# Patient Record
Sex: MALE | Race: WHITE | NOT HISPANIC OR LATINO
[De-identification: names, ages, dates, MRNs, and addresses within clinical notes are randomized per-mention and may not be internally consistent; named-entity substitution may affect disease eponyms.]

---

## 2022-12-08 ENCOUNTER — TRANSCRIPTION ENCOUNTER (OUTPATIENT)
Age: 62
End: 2022-12-08

## 2022-12-08 ENCOUNTER — APPOINTMENT (OUTPATIENT)
Dept: UROLOGY | Facility: CLINIC | Age: 62
End: 2022-12-08

## 2022-12-08 VITALS
WEIGHT: 215 LBS | HEART RATE: 91 BPM | HEIGHT: 72 IN | SYSTOLIC BLOOD PRESSURE: 167 MMHG | DIASTOLIC BLOOD PRESSURE: 80 MMHG | TEMPERATURE: 97.1 F | BODY MASS INDEX: 29.12 KG/M2 | OXYGEN SATURATION: 98 %

## 2022-12-08 VITALS
HEART RATE: 91 BPM | OXYGEN SATURATION: 98 % | DIASTOLIC BLOOD PRESSURE: 88 MMHG | TEMPERATURE: 97.5 F | SYSTOLIC BLOOD PRESSURE: 124 MMHG

## 2022-12-08 DIAGNOSIS — Z00.00 ENCOUNTER FOR GENERAL ADULT MEDICAL EXAMINATION W/OUT ABNORMAL FINDINGS: ICD-10-CM

## 2022-12-08 PROCEDURE — 99205 OFFICE O/P NEW HI 60 MIN: CPT | Mod: 25

## 2022-12-08 PROCEDURE — 51798 US URINE CAPACITY MEASURE: CPT

## 2022-12-08 NOTE — HISTORY OF PRESENT ILLNESS
[FreeTextEntry1] : Merrick Downs MD\par [address] floor, [place] [address]\par \par \par CC: BPH, PSA screening, urgency, frequency, nocturia\par \par HPI: \par PSA was in the 2-3 range and up to 5 ng/ml, leading to a prostate biopsy (benign) in 2011, at which time prostate volume measured at 28 cc.\par \par His PSA has risen over the years, and has had sonograms, MRIs, and testing at [facility] which suggested low risk.  He is here in part for follow up of PSA/prostate cancer screening. \par \par In addition, he c/o an "enlarged prostate" and episodes of urinary frequency which correlates/exacerbated by constipation.  He can have urgency, frequency, nocturia, but this comes and goes.  He has never taken medication for LUTS/BPH.  He also has a sense of incomplete voiding at times, and today the PVR is 69cc. \par \par He states rare straining.  Rare weak stream.  "It is mostly the urgency, frequency, and getting up at night" but this is not all the time. \par \par Urinalysis WNL, PSA 7.0 ng/ml  11/2022 \par \par For a PSA of 4.26 (1/2016) he had an MRI 2016: 70 grams; no specific lesion \par \par 3/25/2021 TRUS demonstrated 93 cc prostate \par \par FAMHX: No  history \par SURGHX: Prostate biopsy, arthroscopic surgery \par SOCIAL:  Retired (worked for [employer] and [employer]), , 2 children, prior smoker (in his 20's) \par ROS: Ocular migraines, vertigo, HTN, hyperlipidemia, no other complaints across 10 system \par

## 2022-12-08 NOTE — ASSESSMENT
[FreeTextEntry1] : Diagnosis: \par BPH\par Frequency, urgency, nocturia\par Elevated PSA\par Retention of urine \par \par Plan\par Discussed observation, vs. mono- or dual- medical therapy, outlet procedures and compared r/b/a and side effects. \par Plan to start dual medical therapy \par \par PSA elevated\par Plan for MRI and SelectMDX for CAP screening/assessment \par \par Follow up in 3 months for check of symptoms and PVR \par \par Kaiden Nagy MD, FACS, FRCS \par  of [facility]rology [facility]\par Director of Laparoscopic and Robotic Surgery \par [facility] Director of Urology, [facility] \par Professor of Urology\par \par (Office) [phone]\par (Cell)  [phone] \par [email or web address]\par \par \par

## 2022-12-08 NOTE — PHYSICAL EXAM
[General Appearance - Well Developed] : well developed [General Appearance - Well Nourished] : well nourished [Normal Appearance] : normal appearance [Well Groomed] : well groomed [General Appearance - In No Acute Distress] : no acute distress [Edema] : no peripheral edema [] : no respiratory distress [Respiration, Rhythm And Depth] : normal respiratory rhythm and effort [Exaggerated Use Of Accessory Muscles For Inspiration] : no accessory muscle use [Abdomen Soft] : soft [Abdomen Tenderness] : non-tender [Costovertebral Angle Tenderness] : no ~M costovertebral angle tenderness [Urethral Meatus] : meatus normal [Urinary Bladder Findings] : the bladder was normal on palpation [Scrotum] : the scrotum was normal [Testes Mass (___cm)] : there were no testicular masses [No Prostate Nodules] : no prostate nodules [Prostate Size ___ gm] : prostate size [unfilled] gm [Normal Station and Gait] : the gait and station were normal for the patient's age [No Focal Deficits] : no focal deficits [Oriented To Time, Place, And Person] : oriented to person, place, and time [Affect] : the affect was normal [Mood] : the mood was normal [Not Anxious] : not anxious [No Palpable Adenopathy] : no palpable adenopathy

## 2022-12-14 ENCOUNTER — RESULT REVIEW (OUTPATIENT)
Age: 62
End: 2022-12-14

## 2022-12-14 ENCOUNTER — APPOINTMENT (OUTPATIENT)
Dept: MRI IMAGING | Facility: CLINIC | Age: 62
End: 2022-12-14

## 2022-12-14 ENCOUNTER — OUTPATIENT (OUTPATIENT)
Dept: OUTPATIENT SERVICES | Facility: HOSPITAL | Age: 62
LOS: 1 days | End: 2022-12-14

## 2022-12-14 PROCEDURE — 72197 MRI PELVIS W/O & W/DYE: CPT | Mod: 26

## 2022-12-16 ENCOUNTER — NON-APPOINTMENT (OUTPATIENT)
Age: 62
End: 2022-12-16

## 2023-01-04 LAB
APPEARANCE: CLEAR
BACTERIA UR CULT: NORMAL
BACTERIA: NEGATIVE
BILIRUBIN URINE: NEGATIVE
BLOOD URINE: NEGATIVE
CALCIUM OXALATE CRYSTALS: ABNORMAL
COLOR: YELLOW
GLUCOSE QUALITATIVE U: NEGATIVE
HYALINE CASTS: 0 /LPF
KETONES URINE: NEGATIVE
LEUKOCYTE ESTERASE URINE: NEGATIVE
MICROSCOPIC-UA: NORMAL
NITRITE URINE: NEGATIVE
PH URINE: 6
PROTEIN URINE: NORMAL
RED BLOOD CELLS URINE: 3 /HPF
SPECIFIC GRAVITY URINE: 1.03
SQUAMOUS EPITHELIAL CELLS: 0 /HPF
UROBILINOGEN URINE: NORMAL
WHITE BLOOD CELLS URINE: 1 /HPF

## 2023-02-13 VITALS — HEIGHT: 72 IN | WEIGHT: 205 LBS | BODY MASS INDEX: 27.77 KG/M2

## 2023-02-14 ENCOUNTER — APPOINTMENT (OUTPATIENT)
Dept: UROLOGY | Facility: CLINIC | Age: 63
End: 2023-02-14
Payer: COMMERCIAL

## 2023-02-14 DIAGNOSIS — Z84.89 FAMILY HISTORY OF OTHER SPECIFIED CONDITIONS: ICD-10-CM

## 2023-02-14 DIAGNOSIS — Z72.89 OTHER PROBLEMS RELATED TO LIFESTYLE: ICD-10-CM

## 2023-02-14 DIAGNOSIS — Z80.8 FAMILY HISTORY OF MALIGNANT NEOPLASM OF OTHER ORGANS OR SYSTEMS: ICD-10-CM

## 2023-02-14 DIAGNOSIS — N40.0 BENIGN PROSTATIC HYPERPLASIA WITHOUT LOWER URINARY TRACT SYMPMS: ICD-10-CM

## 2023-02-14 DIAGNOSIS — Z86.79 PERSONAL HISTORY OF OTHER DISEASES OF THE CIRCULATORY SYSTEM: ICD-10-CM

## 2023-02-14 DIAGNOSIS — Z86.39 PERSONAL HISTORY OF OTHER ENDOCRINE, NUTRITIONAL AND METABOLIC DISEASE: ICD-10-CM

## 2023-02-14 DIAGNOSIS — Z87.891 PERSONAL HISTORY OF NICOTINE DEPENDENCE: ICD-10-CM

## 2023-02-14 PROCEDURE — 99214 OFFICE O/P EST MOD 30 MIN: CPT | Mod: 95

## 2023-02-14 RX ORDER — LEVOTHYROXINE SODIUM 0.05 MG/1
50 TABLET ORAL
Refills: 0 | Status: ACTIVE | COMMUNITY

## 2023-02-14 RX ORDER — ENALAPRIL MALEATE 10 MG/1
10 TABLET ORAL
Refills: 0 | Status: ACTIVE | COMMUNITY

## 2023-02-14 RX ORDER — LIOTHYRONINE SODIUM 5 UG/1
5 TABLET ORAL
Refills: 0 | Status: ACTIVE | COMMUNITY

## 2023-02-14 RX ORDER — ROSUVASTATIN CALCIUM 20 MG/1
20 TABLET, FILM COATED ORAL
Refills: 0 | Status: ACTIVE | COMMUNITY

## 2023-02-14 RX ORDER — FENOFIBRATE 160 MG/1
160 TABLET ORAL
Refills: 0 | Status: ACTIVE | COMMUNITY

## 2023-02-14 RX ORDER — LEVOTHYROXINE SODIUM 0.3 MG/1
300 TABLET ORAL
Refills: 0 | Status: ACTIVE | COMMUNITY

## 2023-02-14 NOTE — HISTORY OF PRESENT ILLNESS
[Home] : at home, [unfilled] , at the time of the visit. [Medical Office: ([facility])___] : at the medical office located in  [Verbal consent obtained from patient] : the patient, [unfilled] [FreeTextEntry1] : Dear Dr Nagy (Urologist)\par Dear Dr Downs (PCP)\par \par \par \par Thank you so much for the referral to help care for your patient.\par \par  \par \par Chief Complaint: Elevated PSA\par Date of first visit: 02/14/2023\par \par \par MARSHALL DICKSON  is a 62 year old  man with PMHx HTN HLD BPH hypothyroidism who presents for biopsy consult. His PSA is 7.0 ng/ml. MRI on 12/14/22 demonstrated a PIRADS 3 lesion left apex pzpl. His PSA density is normal (0.07 ng/ml/cc). Has had one prior negative biopsy in 2011 (c/b retention). Denies family hx of  malignancies. \par \par 12/8/22 Select MDx 28% risk of HG disease\par \par Experiencing moderate LUTS on dual therapy. Started finasteride in December. Symptoms include frequency, urgency, double voiding. Drinks 1 cup coffee daily. Denies weak flow, dysuria.\par  \par PSA Hx (not corrected for 5ARI)\par 7.0 ng/ml on 11/29/2022\par 6.61 ng/ml on 09/08/2020 \par 2.13 ng/ml (4Kscore) on 01/12/2016\par 6.2 ng/ml on 10/23/2015 \par \par MRI Hx\par MRI at [facility] on 12/14/2022.  Volume 88.4 ml prostate with PIRADS 3 lesion #1 to the left posterolateral peripheral zone at apex measuring [4.2 x 5.4 mm ].  0.6 cm left mesorectal node. No EPE, No Bony Lesions.  The clinical implications were discussed with the patient.\par \par  MRI at [facility] [place] [place] 02/04/2016.  Volume 70.1 ml prostate with PIRADS ? lesion at the apex to mid gland. In the posterior midline base, extending the left peripheral zone. Unchanged left external iliac node and 7mm left mesorectal LN.  No EPE.  The clinical implications were discussed with the patient.\par \par MRI at [facility] 08/17/2015.  Volume 72 g prostate with PIRADS ? lesion at the left base signal abnormality-focal prostatitis versus neoplasm, measuring [0.9 x 2.1 x 0.9 cm ].  No LAD No EPE.  Left femoral neck nonspecific lesion. The clinical implications were discussed with the patient.\par \par 02/14/2023\par IPSS 11 QOL 3\par IIEF\par Erectile Function Score: 23/30\par Orgasmic Function Score: 10/10\par Sexual Desire Score: 8/10\par [place] Satisfaction: 10/15\par Overall Satisfaction: 8/10\par \par Prostate cancer screening: the patient and I spoke at length about prostate cancer screening, its risks and its benefits. The patient has 2 (age, PSA) risk factors for prostate cancer.  He understands that many men with prostate cancer will die with the disease rather than of it and we also discussed the results large multi-center American and  prostate cancer screening trials. He also understands that PSA in and of itself does not diagnose prostate cancer but only assesses risk to a certain degree. The patient understands that to definitively screen for prostate cancer, a biopsy is required and this procedure has risks, including bleeding, infection, ED and urinary retention. The patient opted to proceed with fusion biopsy.\par \par The patient denies fevers, chills, nausea and or vomiting and no unexplained weight loss.\par \par All pertinent laboratory, films and physician notes were reviewed.  Questionnaire results were discussed with patient.

## 2023-02-14 NOTE — ASSESSMENT
[FreeTextEntry1] : 63 yo male with elevated PSA 7 ng/ml, MRI with PIRADS 3 lesion left apex pzpl and 6mm mesorectal LN, normal PSAD 0.07 ng/ml/cc, one prior negative biopsy (hx of retention), neg FH. Negative NAVEED with Dr Nagy on 12/8/22.\par \par 1. Book for biopsy with Dr Lopez- discussed indication, prep, procedure, expected/adverse SE, and recovery\par 2. Need all MRIs uploaded for comparison- RDP team aware\par 3. PVR 69 at last office visit. Continue dual therapy for BPH. \par 4. If pathology +, PSMA PET/CT given mesorectal LN on MRI\par \par He understands that many men with prostate cancer will die with the disease rather than of it and we also discussed the results large multi-center American and  prostate cancer screening trials. He also understands that PSA in and of itself does not diagnose prostate cancer but only assesses risk to a certain degree. The patient understands that to definitively screen for prostate cancer, a biopsy is required and this procedure has risks, including bleeding, infection, ED and urinary retention. The patient opted to move forward with the biopsy.\par \par The patient is aware to expect hematuria x 2 weeks and upto 4 weeks of hematospermia.  There is a risk of infection albeit much lower than a transrectal approach. In some cases patients can experience erectile dysfunction but this is usually self limiting.  Any fever/chills after the biopsy the patient is to contact the office and go to the ER for an immediate evaluation. He has been given paper instructions outlining these items - which includes medications to avoid prior to surgery.\par \par 1. CBC, BMP, PSA, Covid Test, UA UCx. EKG echo report\par 2. Medical Clearance\par 3. TP biopsy at [facility]\par 4. follow up 2 weeks after biopsy with his primary urologist or ourselves.\par 5. we will call with the path results once they are resulted.\par \par Follow up MRI review\par Biopsy with Dr Lopez\par Postop visit with Dr Nagy\par \par \par \par Steff White was in the office during the entirety of this telemedicine visit.

## 2023-02-27 ENCOUNTER — APPOINTMENT (OUTPATIENT)
Dept: UROLOGY | Facility: CLINIC | Age: 63
End: 2023-02-27
Payer: COMMERCIAL

## 2023-02-27 VITALS
OXYGEN SATURATION: 97 % | SYSTOLIC BLOOD PRESSURE: 123 MMHG | HEART RATE: 100 BPM | TEMPERATURE: 97.6 F | DIASTOLIC BLOOD PRESSURE: 75 MMHG

## 2023-02-27 DIAGNOSIS — R97.20 ELEVATED PROSTATE, SPECIFIC ANTIGEN [PSA]: ICD-10-CM

## 2023-02-27 PROCEDURE — 99214 OFFICE O/P EST MOD 30 MIN: CPT

## 2023-02-28 ENCOUNTER — TRANSCRIPTION ENCOUNTER (OUTPATIENT)
Age: 63
End: 2023-02-28

## 2023-02-28 LAB
ANION GAP SERPL CALC-SCNC: 14 MMOL/L
APPEARANCE: CLEAR
BACTERIA: NEGATIVE
BASOPHILS # BLD AUTO: 0.04 K/UL
BASOPHILS NFR BLD AUTO: 0.4 %
BILIRUBIN URINE: NEGATIVE
BLOOD URINE: NEGATIVE
BUN SERPL-MCNC: 19 MG/DL
CALCIUM SERPL-MCNC: 9.8 MG/DL
CHLORIDE SERPL-SCNC: 106 MMOL/L
CO2 SERPL-SCNC: 19 MMOL/L
COLOR: YELLOW
CREAT SERPL-MCNC: 0.76 MG/DL
EGFR: 102 ML/MIN/1.73M2
EOSINOPHIL # BLD AUTO: 0.1 K/UL
EOSINOPHIL NFR BLD AUTO: 0.9 %
GLUCOSE QUALITATIVE U: ABNORMAL
GLUCOSE SERPL-MCNC: 128 MG/DL
HCT VFR BLD CALC: 47.3 %
HGB BLD-MCNC: 15.4 G/DL
HYALINE CASTS: 0 /LPF
IMM GRANULOCYTES NFR BLD AUTO: 0.5 %
KETONES URINE: NEGATIVE
LEUKOCYTE ESTERASE URINE: NEGATIVE
LYMPHOCYTES # BLD AUTO: 2.49 K/UL
LYMPHOCYTES NFR BLD AUTO: 23.6 %
MAN DIFF?: NORMAL
MCHC RBC-ENTMCNC: 28.5 PG
MCHC RBC-ENTMCNC: 32.6 GM/DL
MCV RBC AUTO: 87.6 FL
MICROSCOPIC-UA: NORMAL
MONOCYTES # BLD AUTO: 0.72 K/UL
MONOCYTES NFR BLD AUTO: 6.8 %
NEUTROPHILS # BLD AUTO: 7.15 K/UL
NEUTROPHILS NFR BLD AUTO: 67.8 %
NITRITE URINE: NEGATIVE
PH URINE: 6
PLATELET # BLD AUTO: 340 K/UL
POTASSIUM SERPL-SCNC: 4.2 MMOL/L
PROTEIN URINE: NEGATIVE
PSA FREE FLD-MCNC: 23 %
PSA FREE SERPL-MCNC: 0.66 NG/ML
PSA SERPL-MCNC: 2.93 NG/ML
RBC # BLD: 5.4 M/UL
RBC # FLD: 13.2 %
RED BLOOD CELLS URINE: 1 /HPF
SODIUM SERPL-SCNC: 139 MMOL/L
SPECIFIC GRAVITY URINE: 1.02
SQUAMOUS EPITHELIAL CELLS: 0 /HPF
UROBILINOGEN URINE: NORMAL
WBC # FLD AUTO: 10.55 K/UL
WHITE BLOOD CELLS URINE: 1 /HPF

## 2023-02-28 NOTE — PHYSICAL EXAM
[General Appearance - Well Developed] : well developed [General Appearance - Well Nourished] : well nourished [Normal Appearance] : normal appearance [Well Groomed] : well groomed [General Appearance - In No Acute Distress] : no acute distress [] : no respiratory distress [Respiration, Rhythm And Depth] : normal respiratory rhythm and effort [Exaggerated Use Of Accessory Muscles For Inspiration] : no accessory muscle use [Oriented To Time, Place, And Person] : oriented to person, place, and time [Affect] : the affect was normal [Mood] : the mood was normal [Not Anxious] : not anxious [Normal Station and Gait] : the gait and station were normal for the patient's age [No Focal Deficits] : no focal deficits [FreeTextEntry1] : neg NAVEED 12/8/22

## 2023-02-28 NOTE — ASSESSMENT
[FreeTextEntry1] : 61 yo male with elevated PSA 7 ng/ml, MRI with PIRADS 3 lesion left apex pzpl and 6mm mesorectal LN, normal PSAD 0.07 ng/ml/cc, one prior negative biopsy (hx of retention), neg FH. Negative NAVEED with Dr Nagy on 12/8/22. Select MDx 28% risk of HG disease.\par \par 1. Biopsy 3/16/23 with Dr Lopez. MRI reviewed today 2/27/23- lesion may actually PIRADS 4 if you look at focal enhancement\par 2. PVR 69 at last office visit. Continue dual therapy for BPH. \par 3. If pathology +, PSMA PET/CT given mesorectal LN on MRI\par \par IRB Notification\par \par The patient has been made aware of the opportunity to participate in our MR US fusion guided biopsy trial testing the next generation biopsy technology.  This is an IRB approved trial (IRB #).  He is already being consented for a standard MR US fusion guided biopsy therefore there is no change in his clinical work flow.  He has been given a copy of the consent to review before the biopsy date and given an opportunity to contact us with any further questions.  He will be consented on the day of the procedure or electronically before the biopsy.\par \par He understands that many men with prostate cancer will die with the disease rather than of it and we also discussed the results large multi-center American and  prostate cancer screening trials. He also understands that PSA in and of itself does not diagnose prostate cancer but only assesses risk to a certain degree. The patient understands that to definitively screen for prostate cancer, a biopsy is required and this procedure has risks, including bleeding, infection, ED and urinary retention. The patient opted to move forward with the biopsy.\par \par The patient is aware to expect hematuria x 2 weeks and upto 4 weeks of hematospermia.  There is a risk of infection albeit much lower than a transrectal approach. In some cases patients can experience erectile dysfunction but this is usually self limiting.  Any fever/chills after the biopsy the patient is to contact the office and go to the ER for an immediate evaluation. He has been given paper instructions outlining these items - which includes medications to avoid prior to surgery.\par \par 1. CBC, BMP, PSA, Covid Test, UA UCx. EKG echo report\par 2. Medical Clearance\par 3. TP biopsy at [facility]\par 4. follow up 2 weeks after biopsy with his primary urologist or ourselves.\par 5. we will call with the path results once they are resulted.\par \par Thank you very much for allowing me to assist in the care of this patient. Should you have any additional questions or concerns please do not hesitate to contact me.\par \par \par Sincerely,\par \par \par Jono Lopez D.O.\par Professor of Urology and Radiology\par  of Urology at [facility]\par System Director for Prostate Cancer\par [address] [facility], [address]\par Phone: [phone]\par \par \par

## 2023-02-28 NOTE — HISTORY OF PRESENT ILLNESS
[FreeTextEntry1] : Dear Dr Nagy (Urologist)\par Dear Dr Downs (PCP)\par \par Thank you so much for the referral to help care for your patient.\par \par \par Chief Complaint: Elevated PSA\par Date of first visit: 02/14/2023\par \par \par MARSHALL DICKSON  is a 62 year old  man with PMHx HTN HLD BPH hypothyroidism who presents for biopsy consult. His PSA is 7.0 ng/ml. MRI on 12/14/22 demonstrated a PIRADS 3 lesion left apex pzpl. His PSA density is normal (0.07 ng/ml/cc). Has had one prior negative biopsy in 2011 (c/b retention). Denies family hx of  malignancies. \par \par 12/8/22 Select MDx 28% risk of HG disease\par \par Experiencing moderate LUTS on dual therapy. Started finasteride in December. Symptoms include frequency, urgency, double voiding. Drinks 1 cup coffee daily. Denies weak flow, dysuria.\par  \par PSA Hx (not corrected for 5ARI. started it in Dec 2022)\par 7.0 ng/ml on 11/29/2022\par 6.61 ng/ml on 09/08/2020 \par 2.13 ng/ml (4Kscore) on 01/12/2016\par 6.2 ng/ml on 10/23/2015 \par \par MRI Hx\par MRI at [facility] on 12/14/2022.  Volume 88.4 ml prostate with PIRADS 3 lesion #1 to the left posterolateral peripheral zone at apex measuring [4.2 x 5.4 mm ].  0.6 cm left mesorectal node. No EPE, No Bony Lesions. The images have been reviewed and clinical implications discussed with the patient. On review, lesion may actually PIRADS 4 if you look at focal enhancement\par \par  MRI at [facility] [place] 02/04/2016.  Volume 70.1 ml prostate with PIRADS ? lesion at the apex to mid gland. In the posterior midline base, extending the left peripheral zone. Unchanged left external iliac node and 7mm left mesorectal LN.  No EPE.  The images have been reviewed and clinical implications discussed with the patient.\par \par MRI at [facility] 08/17/2015.  Volume 72 g prostate with PIRADS ? lesion at the left base signal abnormality-focal prostatitis versus neoplasm, measuring [0.9 x 2.1 x 0.9 cm ].  No LAD No EPE.  Left femoral neck nonspecific lesion. The images have been reviewed and clinical implications discussed with the patient.\par \par 02/27/2023\par IPSS 9 QOL 2\par EDWARD 20\par \par 02/14/2023\par IPSS 11 QOL 3\par IIEF\par Erectile Function Score: 23/30\par Orgasmic Function Score: 10/10\par Sexual Desire Score: 8/10\par [place] Satisfaction: 10/15\par Overall Satisfaction: 8/10\par \par Prostate cancer screening: the patient and I spoke at length about prostate cancer screening, its risks and its benefits. The patient has 2 (age, PSA) risk factors for prostate cancer.  He understands that many men with prostate cancer will die with the disease rather than of it and we also discussed the results large multi-center American and  prostate cancer screening trials. He also understands that PSA in and of itself does not diagnose prostate cancer but only assesses risk to a certain degree. The patient understands that to definitively screen for prostate cancer, a biopsy is required and this procedure has risks, including bleeding, infection, ED and urinary retention. The patient opted to proceed with fusion biopsy.\par \par The patient denies fevers, chills, nausea and or vomiting and no unexplained weight loss.\par \par All pertinent laboratory, films and physician notes were reviewed.  Questionnaire results were discussed with patient.

## 2023-03-01 LAB — BACTERIA UR CULT: NORMAL

## 2023-03-07 ENCOUNTER — APPOINTMENT (OUTPATIENT)
Dept: UROLOGY | Facility: CLINIC | Age: 63
End: 2023-03-07

## 2023-03-09 ENCOUNTER — TRANSCRIPTION ENCOUNTER (OUTPATIENT)
Age: 63
End: 2023-03-09

## 2023-03-13 LAB — SARS-COV-2 N GENE NPH QL NAA+PROBE: NOT DETECTED

## 2023-03-15 NOTE — ASU PATIENT PROFILE, ADULT - FALL HARM RISK - UNIVERSAL INTERVENTIONS
Bed in lowest position, wheels locked, appropriate side rails in place/Call bell, personal items and telephone in reach/Instruct patient to call for assistance before getting out of bed or chair/Non-slip footwear when patient is out of bed/[place] to call system/Physically safe environment - no spills, clutter or unnecessary equipment/Purposeful Proactive Rounding/Room/bathroom lighting operational, light cord in reach

## 2023-03-15 NOTE — ASU PATIENT PROFILE, ADULT - NSICDXPASTMEDICALHX_GEN_ALL_CORE_FT
PAST MEDICAL HISTORY:  CAD (coronary artery disease)     Dyslipidemia     Hypertension     Hypothyroid

## 2023-03-15 NOTE — ASU PATIENT PROFILE, ADULT - NS PREOP UNDERSTANDS INFO
NPO solids after midnight 3/15/23, clears after 10 am on DOS, bring insurance card and photo ID, Escort to bring photo ID , address and phone # reviewed with pt./yes

## 2023-03-16 ENCOUNTER — OUTPATIENT (OUTPATIENT)
Dept: OUTPATIENT SERVICES | Facility: HOSPITAL | Age: 63
LOS: 1 days | Discharge: HOME | End: 2023-03-16
Payer: COMMERCIAL

## 2023-03-16 ENCOUNTER — APPOINTMENT (OUTPATIENT)
Dept: UROLOGY | Facility: AMBULATORY SURGERY CENTER | Age: 63
End: 2023-03-16

## 2023-03-16 ENCOUNTER — RESULT REVIEW (OUTPATIENT)
Age: 63
End: 2023-03-16

## 2023-03-16 ENCOUNTER — TRANSCRIPTION ENCOUNTER (OUTPATIENT)
Age: 63
End: 2023-03-16

## 2023-03-16 VITALS
TEMPERATURE: 98 F | HEART RATE: 73 BPM | RESPIRATION RATE: 16 BRPM | SYSTOLIC BLOOD PRESSURE: 142 MMHG | DIASTOLIC BLOOD PRESSURE: 83 MMHG | OXYGEN SATURATION: 96 %

## 2023-03-16 VITALS
HEIGHT: 72 IN | OXYGEN SATURATION: 96 % | SYSTOLIC BLOOD PRESSURE: 151 MMHG | TEMPERATURE: 99 F | WEIGHT: 203.93 LBS | DIASTOLIC BLOOD PRESSURE: 81 MMHG | HEART RATE: 74 BPM | RESPIRATION RATE: 16 BRPM

## 2023-03-16 PROCEDURE — 55700: CPT | Mod: 22

## 2023-03-16 PROCEDURE — 76377 3D RENDER W/INTRP POSTPROCES: CPT | Mod: 26

## 2023-03-16 PROCEDURE — 76872 US TRANSRECTAL: CPT | Mod: 26

## 2023-03-16 PROCEDURE — G0416: CPT | Mod: 26

## 2023-03-16 RX ORDER — LEVOTHYROXINE SODIUM 125 MCG
1 TABLET ORAL
Qty: 0 | Refills: 0 | DISCHARGE

## 2023-03-16 RX ORDER — ONDANSETRON 8 MG/1
4 TABLET, FILM COATED ORAL ONCE
Refills: 0 | Status: DISCONTINUED | OUTPATIENT
Start: 2023-03-16 | End: 2023-03-16

## 2023-03-16 RX ORDER — FENTANYL CITRATE 50 UG/ML
25 INJECTION INTRAVENOUS
Refills: 0 | Status: DISCONTINUED | OUTPATIENT
Start: 2023-03-16 | End: 2023-03-16

## 2023-03-16 RX ORDER — OXYCODONE HYDROCHLORIDE 5 MG/1
5 TABLET ORAL ONCE
Refills: 0 | Status: DISCONTINUED | OUTPATIENT
Start: 2023-03-16 | End: 2023-03-16

## 2023-03-16 RX ORDER — LIOTHYRONINE SODIUM 25 UG/1
1 TABLET ORAL
Qty: 0 | Refills: 0 | DISCHARGE

## 2023-03-16 RX ORDER — FENOFIBRATE,MICRONIZED 130 MG
1 CAPSULE ORAL
Qty: 0 | Refills: 0 | DISCHARGE

## 2023-03-16 RX ORDER — SODIUM CHLORIDE 9 MG/ML
1000 INJECTION, SOLUTION INTRAVENOUS
Refills: 0 | Status: DISCONTINUED | OUTPATIENT
Start: 2023-03-16 | End: 2023-03-16

## 2023-03-16 RX ORDER — TAMSULOSIN HYDROCHLORIDE 0.4 MG/1
1 CAPSULE ORAL
Qty: 0 | Refills: 0 | DISCHARGE

## 2023-03-16 RX ORDER — HYDROMORPHONE HYDROCHLORIDE 2 MG/ML
0.5 INJECTION INTRAMUSCULAR; INTRAVENOUS; SUBCUTANEOUS
Refills: 0 | Status: DISCONTINUED | OUTPATIENT
Start: 2023-03-16 | End: 2023-03-16

## 2023-03-16 NOTE — PRE-ANESTHESIA EVALUATION ADULT - NSANTHOSAYNRD_GEN_A_CORE
No. TIERA screening performed.  STOP BANG Legend: 0-2 = LOW Risk; 3-4 = INTERMEDIATE Risk; 5-8 = HIGH Risk

## 2023-03-16 NOTE — ASU DISCHARGE PLAN (ADULT/PEDIATRIC) - CARE PROVIDER_API CALL
Kindra Lopez (DO)  Urology  [address] [facility], [place] [facility]  Phone: [phone]  Fax: [phone]  Follow Up Time:

## 2023-03-16 NOTE — BRIEF OPERATIVE NOTE - NSICDXBRIEFPROCEDURE_GEN_ALL_CORE_FT
PROCEDURES:  Transperineal template-guided mapping biopsy of prostate 16-Mar-2023 16:43:42  Heavenly Tate

## 2023-03-17 ENCOUNTER — NON-APPOINTMENT (OUTPATIENT)
Age: 63
End: 2023-03-17

## 2023-03-20 LAB — SURGICAL PATHOLOGY STUDY: SIGNIFICANT CHANGE UP

## 2023-03-21 ENCOUNTER — NON-APPOINTMENT (OUTPATIENT)
Age: 63
End: 2023-03-21

## 2023-04-04 ENCOUNTER — APPOINTMENT (OUTPATIENT)
Dept: UROLOGY | Facility: CLINIC | Age: 63
End: 2023-04-04
Payer: COMMERCIAL

## 2023-04-04 VITALS
HEIGHT: 72 IN | SYSTOLIC BLOOD PRESSURE: 127 MMHG | HEART RATE: 80 BPM | OXYGEN SATURATION: 97 % | BODY MASS INDEX: 27.77 KG/M2 | TEMPERATURE: 97.5 F | DIASTOLIC BLOOD PRESSURE: 78 MMHG | WEIGHT: 205 LBS

## 2023-04-04 PROBLEM — E03.9 HYPOTHYROIDISM, UNSPECIFIED: Chronic | Status: ACTIVE | Noted: 2023-03-16

## 2023-04-04 PROBLEM — I25.10 ATHEROSCLEROTIC HEART DISEASE OF NATIVE CORONARY ARTERY WITHOUT ANGINA PECTORIS: Chronic | Status: ACTIVE | Noted: 2023-03-16

## 2023-04-04 PROBLEM — E78.5 HYPERLIPIDEMIA, UNSPECIFIED: Chronic | Status: ACTIVE | Noted: 2023-03-16

## 2023-04-04 PROBLEM — I10 ESSENTIAL (PRIMARY) HYPERTENSION: Chronic | Status: ACTIVE | Noted: 2023-03-16

## 2023-04-04 PROCEDURE — 51798 US URINE CAPACITY MEASURE: CPT

## 2023-04-04 PROCEDURE — 99215 OFFICE O/P EST HI 40 MIN: CPT | Mod: 25

## 2023-04-04 NOTE — HISTORY OF PRESENT ILLNESS
[FreeTextEntry1] : Merrick Downs MD\par [address]\[place] [address]\par \par \par CC: Elevated PSA, negative prostate biopsy\par \par Mr. Ames is a a 63 year old male who underwent prostate biopsy on 3/16/23.  Prior negative biopsy in 2011\par Pathology was benign from biopsy on 3/16/23.\par \par MRI from 12/14/22 with a 88.4 mL prostate\par PIRADs 3 lesion left posterolateral peripheral zone\par \par Currently on Tamsulosin and Finasteride Daily \par PVR today was 55 cc\par \par Some difficulty voiding after prostate biopsy, did not require catheter to be placed.\par \par Baseline urinary symptoms include urinary urgency.  No pushing or straining to start stream, no intermittency of the stream.\par Not overly bother by urgency now that he has been on Finasteride ~ 6 months.\par \par FAMHX: No  history \par SURGHX: Prostate biopsy, arthroscopic surgery \par SOCIAL: Retired (worked for [employer] and [employer]), , 2 children, prior smoker (in his 20's) \par \par

## 2023-04-04 NOTE — ASSESSMENT
[FreeTextEntry1] : Diagnosis: BPH/LUTS, elevated PSA, negative prostate biopsy\par \par Plan:\par PVR today was 55 cc\par Continue with Tamsulosin and Finasteride\par PSA in 6 months \par \par \par RTC in 6 months \par \par Kaiden Nagy MD, FACS, FRCS \par  of Urology [facility]\par Director of Laparoscopic and Robotic Surgery \par [facility] Director of Urology, [facility] \par Professor of Urology\par \par (Office) \par (Cell)  [phone] \par [email or web address]\par \par \par

## 2023-04-04 NOTE — PHYSICAL EXAM
[General Appearance - Well Developed] : well developed [General Appearance - Well Nourished] : well nourished [Normal Appearance] : normal appearance [Well Groomed] : well groomed [General Appearance - In No Acute Distress] : no acute distress [] : no respiratory distress [Respiration, Rhythm And Depth] : normal respiratory rhythm and effort [Exaggerated Use Of Accessory Muscles For Inspiration] : no accessory muscle use [Abdomen Soft] : soft [Abdomen Tenderness] : non-tender [Costovertebral Angle Tenderness] : no ~M costovertebral angle tenderness [No Focal Deficits] : no focal deficits [Oriented To Time, Place, And Person] : oriented to person, place, and time [Affect] : the affect was normal [Mood] : the mood was normal [Not Anxious] : not anxious [Edema] : no peripheral edema [No Palpable Adenopathy] : no palpable adenopathy

## 2023-11-09 ENCOUNTER — TRANSCRIPTION ENCOUNTER (OUTPATIENT)
Age: 63
End: 2023-11-09

## 2023-11-21 ENCOUNTER — APPOINTMENT (OUTPATIENT)
Dept: UROLOGY | Facility: CLINIC | Age: 63
End: 2023-11-21

## 2023-11-21 ENCOUNTER — TRANSCRIPTION ENCOUNTER (OUTPATIENT)
Age: 63
End: 2023-11-21

## 2023-11-21 RX ORDER — TAMSULOSIN HYDROCHLORIDE 0.4 MG/1
0.4 CAPSULE ORAL
Qty: 90 | Refills: 3 | Status: ACTIVE | COMMUNITY
Start: 2022-12-08 | End: 1900-01-01

## 2023-11-22 LAB — PSA SERPL-MCNC: 3.09 NG/ML

## 2023-12-07 ENCOUNTER — APPOINTMENT (OUTPATIENT)
Dept: UROLOGY | Facility: CLINIC | Age: 63
End: 2023-12-07
Payer: COMMERCIAL

## 2023-12-07 VITALS
OXYGEN SATURATION: 98 % | SYSTOLIC BLOOD PRESSURE: 118 MMHG | TEMPERATURE: 98.3 F | HEART RATE: 80 BPM | DIASTOLIC BLOOD PRESSURE: 73 MMHG

## 2023-12-07 PROCEDURE — 51798 US URINE CAPACITY MEASURE: CPT

## 2023-12-07 PROCEDURE — 99213 OFFICE O/P EST LOW 20 MIN: CPT | Mod: 25

## 2023-12-14 RX ORDER — FINASTERIDE 5 MG/1
5 TABLET, FILM COATED ORAL
Qty: 90 | Refills: 3 | Status: ACTIVE | COMMUNITY
Start: 2022-12-08 | End: 1900-01-01

## 2024-12-02 ENCOUNTER — APPOINTMENT (OUTPATIENT)
Dept: UROLOGY | Facility: CLINIC | Age: 64
End: 2024-12-02
Payer: COMMERCIAL

## 2024-12-02 VITALS
OXYGEN SATURATION: 96 % | HEART RATE: 80 BPM | WEIGHT: 205 LBS | SYSTOLIC BLOOD PRESSURE: 129 MMHG | DIASTOLIC BLOOD PRESSURE: 67 MMHG | BODY MASS INDEX: 27.77 KG/M2 | HEIGHT: 72 IN | TEMPERATURE: 98.1 F

## 2024-12-02 DIAGNOSIS — R97.20 ELEVATED PROSTATE, SPECIFIC ANTIGEN [PSA]: ICD-10-CM

## 2024-12-02 DIAGNOSIS — N40.0 BENIGN PROSTATIC HYPERPLASIA WITHOUT LOWER URINARY TRACT SYMPMS: ICD-10-CM

## 2024-12-02 PROCEDURE — 99214 OFFICE O/P EST MOD 30 MIN: CPT

## 2024-12-02 RX ORDER — LOSARTAN POTASSIUM 25 MG/1
25 TABLET, FILM COATED ORAL
Refills: 0 | Status: ACTIVE | COMMUNITY

## 2024-12-04 LAB
APPEARANCE: CLEAR
BACTERIA: NEGATIVE /HPF
BILIRUBIN URINE: NEGATIVE
BLOOD URINE: NEGATIVE
CAST: 0 /LPF
COLOR: YELLOW
EPITHELIAL CELLS: 0 /HPF
GLUCOSE QUALITATIVE U: NEGATIVE MG/DL
KETONES URINE: NEGATIVE MG/DL
LEUKOCYTE ESTERASE URINE: NEGATIVE
MICROSCOPIC-UA: NORMAL
NITRITE URINE: NEGATIVE
PH URINE: 7
PROTEIN URINE: NEGATIVE MG/DL
PSA SERPL-MCNC: 2.15 NG/ML
RED BLOOD CELLS URINE: 2 /HPF
SPECIFIC GRAVITY URINE: 1.02
UROBILINOGEN URINE: 0.2 MG/DL
WHITE BLOOD CELLS URINE: 0 /HPF

## 2025-01-27 ENCOUNTER — RX RENEWAL (OUTPATIENT)
Age: 65
End: 2025-01-27

## (undated) DEVICE — GLV 8 PROTEXIS (WHITE)

## (undated) DEVICE — PLASTIC SOLUTION BOWL 160Z

## (undated) DEVICE — NDL BIOPSY CHIBA 22G X 20CM

## (undated) DEVICE — NDL HYPO REGULAR BEVEL 25G X 1.5" (BLUE)

## (undated) DEVICE — DRSG TELFA 3 X 8

## (undated) DEVICE — NDL MAX CORE 18G X 25CM

## (undated) DEVICE — DRAPE TOWEL BLUE 17" X 24"

## (undated) DEVICE — SYR LUER LOK 50CC

## (undated) DEVICE — GRID BRACHYTHERAPY EZ 18G

## (undated) DEVICE — PREP CHLORAPREP HI-LITE ORANGE 26ML

## (undated) DEVICE — DRAPE MEDIUM SHEET 44" X 70"

## (undated) DEVICE — SYR CATH TIP 2 OZ

## (undated) DEVICE — BALLOON ENDOCAVITY 2X14CM

## (undated) DEVICE — SYR LUER LOK 10CC